# Patient Record
Sex: FEMALE | URBAN - METROPOLITAN AREA
[De-identification: names, ages, dates, MRNs, and addresses within clinical notes are randomized per-mention and may not be internally consistent; named-entity substitution may affect disease eponyms.]

---

## 2021-03-31 ENCOUNTER — OFFICE VISIT (OUTPATIENT)
Dept: URBAN - METROPOLITAN AREA CLINIC 53 | Facility: CLINIC | Age: 86
End: 2021-03-31
Payer: MEDICARE

## 2021-03-31 PROCEDURE — 99214 OFFICE O/P EST MOD 30 MIN: CPT | Performed by: OPHTHALMOLOGY

## 2021-03-31 PROCEDURE — 92134 CPTRZ OPH DX IMG PST SGM RTA: CPT | Performed by: OPHTHALMOLOGY

## 2021-03-31 PROCEDURE — 67028 INJECTION EYE DRUG: CPT | Performed by: OPHTHALMOLOGY

## 2021-03-31 ASSESSMENT — INTRAOCULAR PRESSURE
OD: 19
OS: 16

## 2021-03-31 NOTE — IMPRESSION/PLAN
Impression: Exudative age-related macular degeneration, right eye, with active choroidal neovascularization: H35.5571. Plan: She complains of worse vision OU and has recurrent leakage OD. OCT shows CME/SRF OD and no IRF/SRF OS. 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 We discussed treatment vs observation and the R/B of each. She elects treatment. Avastin injected OD without complication after discussing the R/B/I/A in detail. 
thanks Mony Amor [facility] 1 month OCT OU; avastin OD

## 2021-04-28 ENCOUNTER — PROCEDURE (OUTPATIENT)
Dept: URBAN - METROPOLITAN AREA CLINIC 53 | Facility: CLINIC | Age: 86
End: 2021-04-28
Payer: MEDICARE

## 2021-04-28 PROCEDURE — 92134 CPTRZ OPH DX IMG PST SGM RTA: CPT | Performed by: OPHTHALMOLOGY

## 2021-04-28 PROCEDURE — 67028 INJECTION EYE DRUG: CPT | Performed by: OPHTHALMOLOGY

## 2021-04-28 ASSESSMENT — INTRAOCULAR PRESSURE
OD: 17
OS: 15

## 2021-05-26 ENCOUNTER — PROCEDURE (OUTPATIENT)
Dept: URBAN - METROPOLITAN AREA CLINIC 53 | Facility: CLINIC | Age: 86
End: 2021-05-26
Payer: MEDICARE

## 2021-05-26 PROCEDURE — 92134 CPTRZ OPH DX IMG PST SGM RTA: CPT | Performed by: OPHTHALMOLOGY

## 2021-05-26 PROCEDURE — 67028 INJECTION EYE DRUG: CPT | Performed by: OPHTHALMOLOGY

## 2021-05-26 ASSESSMENT — INTRAOCULAR PRESSURE
OD: 19
OS: 17

## 2021-06-23 ENCOUNTER — OFFICE VISIT (OUTPATIENT)
Dept: URBAN - METROPOLITAN AREA CLINIC 53 | Facility: CLINIC | Age: 86
End: 2021-06-23
Payer: MEDICARE

## 2021-06-23 DIAGNOSIS — H35.3211 EXUDATIVE AGE-RELATED MACULAR DEGENERATION, RIGHT EYE, WITH ACTIVE CHOROIDAL NEOVASCULARIZATION: Primary | ICD-10-CM

## 2021-06-23 DIAGNOSIS — H35.3122 NONEXUDATIVE AGE-RELATED MACULAR DEGENERATION, LEFT EYE, INTERMEDIATE DRY STAGE: ICD-10-CM

## 2021-06-23 DIAGNOSIS — H43.813 VITREOUS DEGENERATION, BILATERAL: ICD-10-CM

## 2021-06-23 PROCEDURE — 99213 OFFICE O/P EST LOW 20 MIN: CPT | Performed by: OPHTHALMOLOGY

## 2021-06-23 PROCEDURE — 67028 INJECTION EYE DRUG: CPT | Performed by: OPHTHALMOLOGY

## 2021-06-23 PROCEDURE — 92134 CPTRZ OPH DX IMG PST SGM RTA: CPT | Performed by: OPHTHALMOLOGY

## 2021-06-23 ASSESSMENT — INTRAOCULAR PRESSURE
OD: 24
OS: 20

## 2021-06-23 NOTE — IMPRESSION/PLAN
Impression: Exudative age-related macular degeneration, right eye, with active choroidal neovascularization: H35.9476. Plan: She complains of fluctuations in her vvision OU and has persistent leakage in the peripheral macula OD. OCT shows atrophic retina OD and no IRF/SRF OU. 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 We discussed treatment vs observation and the R/B of each. She elects treatment. Avastin injected OD without complication after discussing the R/B/I/A in detail. We will try to extend the interval. 
thanks Tomas Singh [facility] 6 weeks OCT OU; jennifer Engle